# Patient Record
Sex: FEMALE | Race: WHITE | NOT HISPANIC OR LATINO | URBAN - NONMETROPOLITAN AREA
[De-identification: names, ages, dates, MRNs, and addresses within clinical notes are randomized per-mention and may not be internally consistent; named-entity substitution may affect disease eponyms.]

---

## 2021-03-05 ENCOUNTER — IMPORTED ENCOUNTER (OUTPATIENT)
Dept: URBAN - NONMETROPOLITAN AREA CLINIC 1 | Facility: CLINIC | Age: 66
End: 2021-03-05

## 2021-03-05 PROBLEM — H16.223: Noted: 2021-03-05

## 2021-03-05 PROBLEM — H52.4: Noted: 2021-03-05

## 2021-03-05 PROBLEM — H52.03: Noted: 2017-11-13

## 2021-03-05 PROBLEM — H52.223: Noted: 2021-03-05

## 2021-03-05 PROBLEM — H25.13: Noted: 2021-03-05

## 2021-03-05 PROBLEM — H04.123: Noted: 2021-03-05

## 2021-03-05 PROCEDURE — 92004 COMPRE OPH EXAM NEW PT 1/>: CPT

## 2021-03-05 PROCEDURE — 92015 DETERMINE REFRACTIVE STATE: CPT

## 2021-03-05 NOTE — PATIENT DISCUSSION
Compound Hyperopic Astigmatism OU w/Presbyopia-  discussed findings w/patient-  new spectacle Rx issued-  continue to monitor yearly or prnDES OU-  discussed findings w/patient-  worsening symptoms noted at this time-  start Xiidra BID OU  samples given today-  patient to call to let us know if she would like Rx-  patient was on Restasis previously w/o results-  RTC 1 year or prnCataracts OU-  discussed findings w/patient-  no treatment indicated at this time-  UV protection recommended-  continue to monitor yearly or prn; 's Notes: MR 3/5/2021DFE 3/5/2021

## 2022-04-09 ASSESSMENT — VISUAL ACUITY
OD_SC: 20/20-2
OS_SC: 20/20

## 2022-04-09 ASSESSMENT — TONOMETRY
OS_IOP_MMHG: 17
OD_IOP_MMHG: 17

## 2023-01-17 ENCOUNTER — COMPREHENSIVE EXAM (OUTPATIENT)
Dept: URBAN - NONMETROPOLITAN AREA CLINIC 4 | Facility: CLINIC | Age: 68
End: 2023-01-17

## 2023-01-17 DIAGNOSIS — H52.223: ICD-10-CM

## 2023-01-17 DIAGNOSIS — H52.4: ICD-10-CM

## 2023-01-17 DIAGNOSIS — H52.03: ICD-10-CM

## 2023-01-17 PROCEDURE — 92014 COMPRE OPH EXAM EST PT 1/>: CPT

## 2023-01-17 PROCEDURE — 92015 DETERMINE REFRACTIVE STATE: CPT

## 2023-01-17 ASSESSMENT — VISUAL ACUITY
OU_CC: 20/20
OU_CC: J1+
OS_CC: 20/20
OD_CC: 20/25

## 2023-01-17 ASSESSMENT — TONOMETRY
OD_IOP_MMHG: 17
OS_IOP_MMHG: 16

## 2023-04-11 ENCOUNTER — CONSULTATION/EVALUATION (OUTPATIENT)
Dept: URBAN - NONMETROPOLITAN AREA CLINIC 4 | Facility: CLINIC | Age: 68
End: 2023-04-11

## 2023-04-11 DIAGNOSIS — H25.813: ICD-10-CM

## 2023-04-11 DIAGNOSIS — Z01.818: ICD-10-CM

## 2023-04-11 PROCEDURE — 92134 CPTRZ OPH DX IMG PST SGM RTA: CPT

## 2023-04-11 PROCEDURE — 92014 COMPRE OPH EXAM EST PT 1/>: CPT

## 2023-04-11 ASSESSMENT — TONOMETRY
OS_IOP_MMHG: 15
OD_IOP_MMHG: 15

## 2023-04-11 ASSESSMENT — VISUAL ACUITY
OS_SC: 20/50
OS_CC: 20/25
OD_CC: 20/30
OD_SC: 20/50
OS_AM: 20/25
OD_PAM: 20/20-
OS_CC: 20/40
OD_CC: 20/25

## 2023-05-08 ENCOUNTER — SURGERY/PROCEDURE (OUTPATIENT)
Dept: URBAN - METROPOLITAN AREA SURGERY 3 | Facility: SURGERY | Age: 68
End: 2023-05-08

## 2023-05-08 ENCOUNTER — POST-OP (OUTPATIENT)
Dept: URBAN - NONMETROPOLITAN AREA CLINIC 4 | Facility: CLINIC | Age: 68
End: 2023-05-08

## 2023-05-08 DIAGNOSIS — Z96.1: ICD-10-CM

## 2023-05-08 DIAGNOSIS — H25.812: ICD-10-CM

## 2023-05-08 PROCEDURE — 99024 POSTOP FOLLOW-UP VISIT: CPT

## 2023-05-08 PROCEDURE — 68841 INSJ RX ELUT IMPLT LAC CANAL: CPT

## 2023-05-08 PROCEDURE — 66984CV REMOVE CATARACT, INSERT LENS, CUSTOM VISION

## 2023-05-08 PROCEDURE — 65772LRI LRI DURING CAT SX

## 2023-05-08 PROCEDURE — 99199PCV PROF CUSTOM VISION PACKAGE

## 2023-05-08 PROCEDURE — 66999LNX LENSX

## 2023-05-08 ASSESSMENT — VISUAL ACUITY: OS_SC: 20/40-1

## 2023-05-08 ASSESSMENT — TONOMETRY: OS_IOP_MMHG: 18

## 2023-05-15 ENCOUNTER — POST-OP (OUTPATIENT)
Dept: RURAL CLINIC 2 | Facility: CLINIC | Age: 68
End: 2023-05-15

## 2023-05-15 DIAGNOSIS — Z96.1: ICD-10-CM

## 2023-05-15 PROCEDURE — 99024 POSTOP FOLLOW-UP VISIT: CPT

## 2023-05-15 ASSESSMENT — TONOMETRY
OD_IOP_MMHG: 22
OS_IOP_MMHG: 19

## 2023-05-15 ASSESSMENT — VISUAL ACUITY
OS_SC: 20/20
OD_SC: 20/100
OD_SC: 20/40
OD_PAM: 20/20

## 2023-06-22 ENCOUNTER — SURGERY/PROCEDURE (OUTPATIENT)
Dept: URBAN - METROPOLITAN AREA SURGERY 3 | Facility: SURGERY | Age: 68
End: 2023-06-22

## 2023-06-22 ENCOUNTER — POST-OP (OUTPATIENT)
Dept: URBAN - NONMETROPOLITAN AREA CLINIC 4 | Facility: CLINIC | Age: 68
End: 2023-06-22

## 2023-06-22 DIAGNOSIS — Z96.1: ICD-10-CM

## 2023-06-22 DIAGNOSIS — H25.811: ICD-10-CM

## 2023-06-22 PROCEDURE — 99024 POSTOP FOLLOW-UP VISIT: CPT

## 2023-06-22 PROCEDURE — 65772LRI LRI DURING CAT SX

## 2023-06-22 PROCEDURE — 66984CV REMOVE CATARACT, INSERT LENS, CUSTOM VISION

## 2023-06-22 PROCEDURE — 66999LNX LENSX

## 2023-06-22 PROCEDURE — 99199PCV PROF CUSTOM VISION PACKAGE

## 2023-06-22 ASSESSMENT — VISUAL ACUITY
OS_SC: 20/20
OD_SC: 20/200
OD_PH: 20/40

## 2023-06-22 ASSESSMENT — TONOMETRY
OD_IOP_MMHG: 23
OS_IOP_MMHG: 13

## 2023-06-29 ENCOUNTER — POST-OP (OUTPATIENT)
Dept: URBAN - NONMETROPOLITAN AREA CLINIC 4 | Facility: CLINIC | Age: 68
End: 2023-06-29

## 2023-06-29 DIAGNOSIS — Z96.1: ICD-10-CM

## 2023-06-29 PROCEDURE — 99024 POSTOP FOLLOW-UP VISIT: CPT

## 2023-06-29 ASSESSMENT — VISUAL ACUITY
OD_SC: 20/25+2
OS_SC: 20/25+2
OU_SC: 20/20

## 2023-06-29 ASSESSMENT — TONOMETRY
OS_IOP_MMHG: 15
OD_IOP_MMHG: 15

## 2024-01-18 ENCOUNTER — ESTABLISHED PATIENT (OUTPATIENT)
Dept: RURAL CLINIC 2 | Facility: CLINIC | Age: 69
End: 2024-01-18

## 2024-01-18 DIAGNOSIS — H43.813: ICD-10-CM

## 2024-01-18 DIAGNOSIS — Z96.1: ICD-10-CM

## 2024-01-18 DIAGNOSIS — H26.493: ICD-10-CM

## 2024-01-18 PROCEDURE — 92014 COMPRE OPH EXAM EST PT 1/>: CPT

## 2024-01-18 ASSESSMENT — TONOMETRY
OD_IOP_MMHG: 14
OS_IOP_MMHG: 14

## 2024-01-18 ASSESSMENT — VISUAL ACUITY
OD_SC: 20/20
OS_SC: 20/20

## 2025-01-17 ENCOUNTER — COMPREHENSIVE EXAM (OUTPATIENT)
Age: 70
End: 2025-01-17

## 2025-01-17 DIAGNOSIS — H43.813: ICD-10-CM

## 2025-01-17 DIAGNOSIS — Z96.1: ICD-10-CM

## 2025-01-17 DIAGNOSIS — H52.4: ICD-10-CM

## 2025-01-17 DIAGNOSIS — H26.493: ICD-10-CM

## 2025-01-17 PROCEDURE — 92015 DETERMINE REFRACTIVE STATE: CPT

## 2025-01-17 PROCEDURE — 92014 COMPRE OPH EXAM EST PT 1/>: CPT
